# Patient Record
Sex: FEMALE | URBAN - METROPOLITAN AREA
[De-identification: names, ages, dates, MRNs, and addresses within clinical notes are randomized per-mention and may not be internally consistent; named-entity substitution may affect disease eponyms.]

---

## 2022-05-24 ENCOUNTER — TRANSFERRED RECORDS (OUTPATIENT)
Dept: HEALTH INFORMATION MANAGEMENT | Facility: CLINIC | Age: 47
End: 2022-05-24

## 2022-05-24 ENCOUNTER — MEDICAL CORRESPONDENCE (OUTPATIENT)
Dept: HEALTH INFORMATION MANAGEMENT | Facility: CLINIC | Age: 47
End: 2022-05-24

## 2022-05-25 ENCOUNTER — TRANSCRIBE ORDERS (OUTPATIENT)
Dept: OPHTHALMOLOGY | Facility: CLINIC | Age: 47
End: 2022-05-25
Payer: COMMERCIAL

## 2022-05-25 DIAGNOSIS — H02.824 CYSTS OF LEFT UPPER EYELID: Primary | ICD-10-CM

## 2022-06-22 ENCOUNTER — OFFICE VISIT (OUTPATIENT)
Dept: OPHTHALMOLOGY | Facility: CLINIC | Age: 47
End: 2022-06-22
Attending: OPTOMETRIST
Payer: COMMERCIAL

## 2022-06-22 DIAGNOSIS — H02.824 CYSTS OF LEFT UPPER EYELID: Primary | ICD-10-CM

## 2022-06-22 PROCEDURE — 88305 TISSUE EXAM BY PATHOLOGIST: CPT | Performed by: OPHTHALMOLOGY

## 2022-06-22 PROCEDURE — 67810 INCAL BX EYELID SKN LID MRGN: CPT | Mod: E1 | Performed by: OPHTHALMOLOGY

## 2022-06-22 PROCEDURE — 99203 OFFICE O/P NEW LOW 30 MIN: CPT | Mod: 25 | Performed by: OPHTHALMOLOGY

## 2022-06-22 RX ORDER — CLINDAMYCIN PHOSPHATE 10 UG/ML
LOTION TOPICAL
COMMUNITY
Start: 2022-02-08

## 2022-06-22 RX ORDER — SENNOSIDES 8.6 MG
CAPSULE ORAL
COMMUNITY
Start: 2022-05-05

## 2022-06-22 RX ORDER — CITALOPRAM HYDROBROMIDE 40 MG/1
TABLET ORAL
COMMUNITY
Start: 2022-05-17

## 2022-06-22 RX ORDER — NEOMYCIN SULFATE, POLYMYXIN B SULFATE, AND DEXAMETHASONE 3.5; 10000; 1 MG/G; [USP'U]/G; MG/G
OINTMENT OPHTHALMIC
COMMUNITY
Start: 2022-05-24

## 2022-06-22 RX ORDER — ERYTHROMYCIN 5 MG/G
OINTMENT OPHTHALMIC 3 TIMES DAILY
Status: CANCELLED | OUTPATIENT
Start: 2022-06-22

## 2022-06-22 RX ORDER — FLUTICASONE PROPIONATE 50 MCG
1 SPRAY, SUSPENSION (ML) NASAL DAILY
COMMUNITY

## 2022-06-22 RX ORDER — FEXOFENADINE HCL 180 MG/1
180 TABLET ORAL
COMMUNITY

## 2022-06-22 RX ORDER — CHLORAL HYDRATE 500 MG
2 CAPSULE ORAL
COMMUNITY

## 2022-06-22 RX ORDER — MESALAMINE 1.2 G/1
TABLET, DELAYED RELEASE ORAL EVERY 24 HOURS
COMMUNITY
Start: 2022-02-08

## 2022-06-22 RX ORDER — NAPROXEN SODIUM 220 MG
TABLET ORAL
COMMUNITY
Start: 2022-05-05

## 2022-06-22 RX ADMIN — ERYTHROMYCIN: 5 OINTMENT OPHTHALMIC at 15:42

## 2022-06-22 ASSESSMENT — SLIT LAMP EXAM - LIDS: COMMENTS: NORMAL

## 2022-06-22 ASSESSMENT — VISUAL ACUITY
OS_SC: 20/25
OD_SC: 20/25
METHOD: SNELLEN - LINEAR

## 2022-06-22 ASSESSMENT — TONOMETRY
OS_IOP_MMHG: 18
OD_IOP_MMHG: 17
IOP_METHOD: TONOPEN

## 2022-06-22 ASSESSMENT — CONF VISUAL FIELD
OD_NORMAL: 1
OS_NORMAL: 1
METHOD: COUNTING FINGERS

## 2022-06-22 NOTE — LETTER
2022         RE:  :  MRN: Yuliya Arthur  1975  [id number]     Dear Dr. Erin M Dunphy,    Thank you for asking me to see your patient, Yuliya Arthur, for an oculoplastic   consultation.  My assessment and plan are below.  For further details, please see my attached clinic note.      Chief Complaint(s) and History of Present Illness(es)     Lesion On Left Upper Lid     Laterality: left upper lid              Comments     Patient referred by Dr. Dunphy for lid lesion, DEBRA. Patient states that   there has been a small bump along the outer corner of her lash line on her   DEBRA for almost 1 year. The size will fluctuate. When it is swollen and   larger she can feel it rubbing against her eye.   Possibly has an eyelash growing in or near the lesion.  No discharge, no change in color.                 Assessment & Plan     Yuliya Arthur is a 46 year old female with the following diagnoses:   Encounter Diagnosis   Name Primary?     Cysts of left upper eyelid Yes     Cystic lesion left upper eyelid margin.  Given enlarging and rubbing on her globe, excisional biopsy is reasonable and she would like to proceed. She would like this done today.          Operative Note - Eyelid Biopsy      2022    Pre-operative Diagnosis: Lesion left eye Upper Eyelid margin     Post-operative Diagnosis: Same.    Procedure: Excision of eyelid neoplasm.    Surgeon: Sb Michael MD    Anesthesia: Local infiltration with 2% Lidocaine and Epinephrine.    Complications: None.    Estimated blood loss: <5 mL    Specimen: Eyelid neoplasm to pathology.    Procedure: The patient was brought to the minor procedure room and placed supine on the operating table.  The involved eyelid was infiltrated with local anesthetic.  The area was prepped and draped in the typical sterile fashion.  A tooth forceps was used to elevate the lesion and it was excised at its base with a Florin scissors. Care was taken to avoid as many lash  follicles as possible.   Hemostasis was obtained with a high temperature cautery.  The excised diameter measured 5 mm.      Closure of wound: Granulation.     Dressing: The wound was dressed with Erythromycin.    Disposition: The patient left the minor procedure room in stable condition.    I was present for the entire procedure. Sb Michael MD                        Patient disposition:   No follow-ups on file.         Again, thank you for allowing me to participate in the care of your patient.      Sincerely,    Sb Michael MD  Department of Ophthalmology and Visual Neurosciences  [facility]    CC: Erin M Dunphy, OD  [facility]  [address] [facility] [address]  Via Fax: [phone]

## 2022-06-22 NOTE — NURSING NOTE
Chief Complaints and History of Present Illnesses   Patient presents with     Lesion On Left Upper Lid       Chief Complaint(s) and History of Present Illness(es)     Lesion On Left Upper Lid     Laterality: left upper lid              Comments     Patient referred by Dr. Dunphy for lid lesion, DEBRA. Patient states that there has been a small bump along the outer corner of her lash line on her DEBRA for almost 1 year. The size will fluctuate. When it is swollen and larger she can feel it rubbing against her eye.   Possibly has an eyelash growing in or near the lesion.  No discharge, no change in color.                 Clifford Crane, Ophthalmic Assistant

## 2022-06-22 NOTE — LETTER
"June 24, 2022       Yuliya Arthur  [address] [place] [address]        Dear Yuliya,    Please see below for your test results. 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     Resulted Orders   Surgical Pathology Exam   Result Value Ref Range    Case Report       Surgical Pathology Report                         Case: [id number]                                  Authorizing Provider:  Sb Michael MD      Collected:           06/22/2022 04:01 PM          Ordering Location:     [facility]   Received:            06/22/2022 04:52 PM                                 [place]                                                                  Pathologist:           Juliet Perdomo MD                                                         Specimen:    Eyelid, Upper, Left                                                                        Final Diagnosis       Upper eyelid, left, biopsy: 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       Clinical Information       The patient is a 46 year old female with a lesion of the left upper eyelid.       Gross Description       A(A). Eyelid, Upper, Left, Eyelid, Upper, Left:  The specimen is received in formalin with proper patient identification, labeled \"left upper eyelid\".  The specimen consists of a 0.4 x 0.4 x 0.3 cm unoriented tan nodule.  It is bisected, wrapped and entirely submitted in cassette A1.       Microscopic Description       The tissue consists of skin with a partial cystic space in the dermis. A portion of the cyst wall consists of stratified squamous keratinizing epithelium and another portion consists of a bilayer of cuboidal epithelium.       Performing Labs       The technical component of this testing was completed at [facility]      Case Images   "       If you have any questions, please call the clinic to make an appointment.    Sincerely,    Sb Michael MD  Ophthalmic Plastic & Reconstructive Surgery  Department of Ophthalmology & Visual Neurosciences    [facility]

## 2022-06-22 NOTE — PROGRESS NOTES
Chief Complaint(s) and History of Present Illness(es)     Lesion On Left Upper Lid     Laterality: left upper lid              Comments     Patient referred by Dr. Dunphy for lid lesion, DEBRA. Patient states that   there has been a small bump along the outer corner of her lash line on her   DEBRA for almost 1 year. The size will fluctuate. When it is swollen and   larger she can feel it rubbing against her eye.   Possibly has an eyelash growing in or near the lesion.  No discharge, no change in color.                 Assessment & Plan     Yuliya Arthur is a 46 year old female with the following diagnoses:   Encounter Diagnosis   Name Primary?     Cysts of left upper eyelid Yes     Cystic lesion left upper eyelid margin.  Given enlarging and rubbing on her globe, excisional biopsy is reasonable and she would like to proceed. She would like this done today.          Operative Note - Eyelid Biopsy      Jun 22, 2022    Pre-operative Diagnosis: Lesion left eye Upper Eyelid margin     Post-operative Diagnosis: Same.    Procedure: Excision of eyelid neoplasm.    Surgeon: Sb Michael MD    Anesthesia: Local infiltration with 2% Lidocaine and Epinephrine.    Complications: None.    Estimated blood loss: <5 mL    Specimen: Eyelid neoplasm to pathology.    Procedure: The patient was brought to the minor procedure room and placed supine on the operating table.  The involved eyelid was infiltrated with local anesthetic.  The area was prepped and draped in the typical sterile fashion.  A tooth forceps was used to elevate the lesion and it was excised at its base with a Florin scissors. Care was taken to avoid as many lash follicles as possible.   Hemostasis was obtained with a high temperature cautery.  The excised diameter measured 5 mm.      Closure of wound: Granulation.     Dressing: The wound was dressed with Erythromycin.    Disposition: The patient left the minor procedure room in stable condition.    I was  present for the entire procedure. Sb Michael MD                        Patient disposition:   No follow-ups on file.        Attending Physician Attestation: Complete documentation of historical and exam elements from today's encounter can be found in the full encounter summary report (not reduplicated in this progress note). I personally obtained the chief complaint(s) and history of present illness. I confirmed and edited as necessary the review of systems, past medical/surgical history, family history, social history, and examination findings as documented by others; and I examined the patient myself. I personally reviewed the relevant tests, images, and reports as documented above. I formulated and edited as necessary the assessment and plan and discussed the findings and management plan with the patient.  -Sb Michael MD

## 2022-06-23 RX ORDER — ERYTHROMYCIN 5 MG/G
OINTMENT OPHTHALMIC ONCE
Status: COMPLETED | OUTPATIENT
Start: 2022-06-22 | End: 2022-06-22

## 2022-06-24 LAB
PATH REPORT.COMMENTS IMP SPEC: NORMAL
PATH REPORT.COMMENTS IMP SPEC: NORMAL
PATH REPORT.FINAL DX SPEC: NORMAL
PATH REPORT.GROSS SPEC: NORMAL
PATH REPORT.MICROSCOPIC SPEC OTHER STN: NORMAL
PATH REPORT.RELEVANT HX SPEC: NORMAL
PHOTO IMAGE: NORMAL